# Patient Record
Sex: FEMALE | Race: WHITE | NOT HISPANIC OR LATINO | Employment: FULL TIME | URBAN - METROPOLITAN AREA
[De-identification: names, ages, dates, MRNs, and addresses within clinical notes are randomized per-mention and may not be internally consistent; named-entity substitution may affect disease eponyms.]

---

## 2022-01-15 ENCOUNTER — HOSPITAL ENCOUNTER (EMERGENCY)
Facility: HOSPITAL | Age: 32
Discharge: HOME | End: 2022-01-15
Attending: EMERGENCY MEDICINE
Payer: COMMERCIAL

## 2022-01-15 ENCOUNTER — APPOINTMENT (EMERGENCY)
Dept: RADIOLOGY | Facility: HOSPITAL | Age: 32
End: 2022-01-15
Payer: COMMERCIAL

## 2022-01-15 VITALS
BODY MASS INDEX: 31.28 KG/M2 | DIASTOLIC BLOOD PRESSURE: 96 MMHG | TEMPERATURE: 98 F | HEIGHT: 62 IN | OXYGEN SATURATION: 100 % | SYSTOLIC BLOOD PRESSURE: 144 MMHG | WEIGHT: 170 LBS | HEART RATE: 119 BPM | RESPIRATION RATE: 18 BRPM

## 2022-01-15 DIAGNOSIS — S82.852A TRIMALLEOLAR FRACTURE OF ANKLE, CLOSED, LEFT, INITIAL ENCOUNTER: Primary | ICD-10-CM

## 2022-01-15 DIAGNOSIS — R52 PAIN: ICD-10-CM

## 2022-01-15 LAB
ANION GAP SERPL CALCULATED.3IONS-SCNC: 14 MMOL/L (ref 4–13)
BASOPHILS # BLD AUTO: 0.03 THOUSANDS/ΜL (ref 0–0.1)
BASOPHILS NFR BLD AUTO: 0 % (ref 0–1)
BUN SERPL-MCNC: 10 MG/DL (ref 5–25)
CALCIUM SERPL-MCNC: 8.6 MG/DL (ref 8.3–10.1)
CHLORIDE SERPL-SCNC: 106 MMOL/L (ref 100–108)
CO2 SERPL-SCNC: 23 MMOL/L (ref 21–32)
CREAT SERPL-MCNC: 0.66 MG/DL (ref 0.6–1.3)
EOSINOPHIL # BLD AUTO: 0.02 THOUSAND/ΜL (ref 0–0.61)
EOSINOPHIL NFR BLD AUTO: 0 % (ref 0–6)
ERYTHROCYTE [DISTWIDTH] IN BLOOD BY AUTOMATED COUNT: 12.4 % (ref 11.6–15.1)
GFR SERPL CREATININE-BSD FRML MDRD: 119 ML/MIN/1.73SQ M
GLUCOSE SERPL-MCNC: 109 MG/DL (ref 65–140)
HCG SERPL QL: NEGATIVE
HCT VFR BLD AUTO: 40.7 % (ref 34.8–46.1)
HGB BLD-MCNC: 14.2 G/DL (ref 11.5–15.4)
IMM GRANULOCYTES # BLD AUTO: 0.02 THOUSAND/UL (ref 0–0.2)
IMM GRANULOCYTES NFR BLD AUTO: 0 % (ref 0–2)
LYMPHOCYTES # BLD AUTO: 1.51 THOUSANDS/ΜL (ref 0.6–4.47)
LYMPHOCYTES NFR BLD AUTO: 21 % (ref 14–44)
MCH RBC QN AUTO: 29.6 PG (ref 26.8–34.3)
MCHC RBC AUTO-ENTMCNC: 34.9 G/DL (ref 31.4–37.4)
MCV RBC AUTO: 85 FL (ref 82–98)
MONOCYTES # BLD AUTO: 0.39 THOUSAND/ΜL (ref 0.17–1.22)
MONOCYTES NFR BLD AUTO: 5 % (ref 4–12)
NEUTROPHILS # BLD AUTO: 5.26 THOUSANDS/ΜL (ref 1.85–7.62)
NEUTS SEG NFR BLD AUTO: 74 % (ref 43–75)
NRBC BLD AUTO-RTO: 0 /100 WBCS
PLATELET # BLD AUTO: 283 THOUSANDS/UL (ref 149–390)
PMV BLD AUTO: 10.2 FL (ref 8.9–12.7)
POTASSIUM SERPL-SCNC: 3.9 MMOL/L (ref 3.5–5.3)
RBC # BLD AUTO: 4.8 MILLION/UL (ref 3.81–5.12)
SODIUM SERPL-SCNC: 143 MMOL/L (ref 136–145)
WBC # BLD AUTO: 7.23 THOUSAND/UL (ref 4.31–10.16)

## 2022-01-15 PROCEDURE — 96360 HYDRATION IV INFUSION INIT: CPT

## 2022-01-15 PROCEDURE — 36415 COLL VENOUS BLD VENIPUNCTURE: CPT | Performed by: EMERGENCY MEDICINE

## 2022-01-15 PROCEDURE — 73600 X-RAY EXAM OF ANKLE: CPT

## 2022-01-15 PROCEDURE — 73590 X-RAY EXAM OF LOWER LEG: CPT

## 2022-01-15 PROCEDURE — 99284 EMERGENCY DEPT VISIT MOD MDM: CPT

## 2022-01-15 PROCEDURE — 99204 OFFICE O/P NEW MOD 45 MIN: CPT | Performed by: ORTHOPAEDIC SURGERY

## 2022-01-15 PROCEDURE — 27818 TREATMENT OF ANKLE FRACTURE: CPT | Performed by: EMERGENCY MEDICINE

## 2022-01-15 PROCEDURE — 85025 COMPLETE CBC W/AUTO DIFF WBC: CPT | Performed by: EMERGENCY MEDICINE

## 2022-01-15 PROCEDURE — 73610 X-RAY EXAM OF ANKLE: CPT

## 2022-01-15 PROCEDURE — 27818 TREATMENT OF ANKLE FRACTURE: CPT | Performed by: ORTHOPAEDIC SURGERY

## 2022-01-15 PROCEDURE — 84703 CHORIONIC GONADOTROPIN ASSAY: CPT | Performed by: EMERGENCY MEDICINE

## 2022-01-15 PROCEDURE — 80048 BASIC METABOLIC PNL TOTAL CA: CPT | Performed by: EMERGENCY MEDICINE

## 2022-01-15 PROCEDURE — 99284 EMERGENCY DEPT VISIT MOD MDM: CPT | Performed by: EMERGENCY MEDICINE

## 2022-01-15 RX ORDER — ONDANSETRON 4 MG/1
4 TABLET, ORALLY DISINTEGRATING ORAL EVERY 8 HOURS PRN
Qty: 20 TABLET | Refills: 0 | Status: SHIPPED | OUTPATIENT
Start: 2022-01-15

## 2022-01-15 RX ORDER — KETAMINE HCL IN NACL, ISO-OSM 100MG/10ML
20 SYRINGE (ML) INJECTION ONCE
Status: DISCONTINUED | OUTPATIENT
Start: 2022-01-15 | End: 2022-01-15 | Stop reason: HOSPADM

## 2022-01-15 RX ORDER — HYDROXYZINE HYDROCHLORIDE 10 MG/1
10 TABLET, FILM COATED ORAL DAILY
COMMUNITY

## 2022-01-15 RX ORDER — LIDOCAINE HYDROCHLORIDE AND EPINEPHRINE 20; 5 MG/ML; UG/ML
1 INJECTION, SOLUTION EPIDURAL; INFILTRATION; INTRACAUDAL; PERINEURAL ONCE
Status: COMPLETED | OUTPATIENT
Start: 2022-01-15 | End: 2022-01-15

## 2022-01-15 RX ORDER — PROPOFOL 10 MG/ML
150 INJECTION, EMULSION INTRAVENOUS ONCE
Status: DISCONTINUED | OUTPATIENT
Start: 2022-01-15 | End: 2022-01-15 | Stop reason: HOSPADM

## 2022-01-15 RX ORDER — MELOXICAM 15 MG/1
15 TABLET ORAL DAILY
Qty: 15 TABLET | Refills: 0 | Status: SHIPPED | OUTPATIENT
Start: 2022-01-15

## 2022-01-15 RX ORDER — HYDROCODONE BITARTRATE AND ACETAMINOPHEN 5; 325 MG/1; MG/1
1 TABLET ORAL EVERY 6 HOURS PRN
Qty: 15 TABLET | Refills: 0 | Status: SHIPPED | OUTPATIENT
Start: 2022-01-15

## 2022-01-15 RX ORDER — ESCITALOPRAM OXALATE 10 MG/1
10 TABLET ORAL DAILY
COMMUNITY

## 2022-01-15 RX ADMIN — LIDOCAINE HYDROCHLORIDE AND EPINEPHRINE 1 ML: 20; 5 INJECTION, SOLUTION EPIDURAL; INFILTRATION; INTRACAUDAL; PERINEURAL at 04:28

## 2022-01-15 RX ADMIN — SODIUM CHLORIDE 1000 ML: 0.9 INJECTION, SOLUTION INTRAVENOUS at 06:53

## 2022-01-15 NOTE — ED NOTES
X-ray at bedside      [facility]  01/15/22 7166

## 2022-01-15 NOTE — CONSULTS
Orthopedics   Garcia Gustafson 36 y o  female MRN: [id number]  Unit/Bed#: [facility] XRAY      Chief Complaint:   left ankle pain    HPI:  36 y  o female status post mechanical fall complaining of left ankle pain and inability to bear weight  Patient has no significant past medical history  Patient states that she had a glass of wine as well as a few beers and slipped and twisted her ankle  She states she had immediate pain and inability to ambulate  She did note a deformity of her ankle and presented to the emergency room for evaluation  She had x-rays which demonstrated a displaced trimalleolar fracture  Reduction was attempted by the ED physician however was unsuccessful  We were consulted to attempt better reduction of her fracture dislocation  She denies any chest pain, shortness of breath, nausea, vomiting, diarrhea, lightheadedness, dizziness  She did have numbness and tingling prior to reduction however post reduction she noted improvement  Review Of Systems:   · Skin: Normal  · Neuro: See HPI  · Musculoskeletal: See HPI  · 14 point review of systems negative except as stated above     Past Medical History:   History reviewed  No pertinent past medical history  Past Surgical History:   History reviewed  No pertinent surgical history  Family History:  Family history reviewed and non-contributory  History reviewed  No pertinent family history      Social History:  Social History     Socioeconomic History    Marital status: Single     Spouse name: None    Number of children: None    Years of education: None    Highest education level: None   Occupational History    None   Tobacco Use    Smoking status: Never Smoker    Smokeless tobacco: Never Used   Substance and Sexual Activity    Alcohol use: None    Drug use: None    Sexual activity: None   Other Topics Concern    None   Social History Narrative    None     Social Determinants of Health     Financial Resource Strain: Not on file   Food Insecurity: Not on file   Transportation Needs: Not on file   Physical Activity: Not on file   Stress: Not on file   Social Connections: Not on file   Intimate Partner Violence: Not on file   Housing Stability: Not on file       Allergies: Allergies   Allergen Reactions    Latex Hives    Treenut [Nuts - Food Allergy] Hives           Labs:  0   Lab Value Date/Time    HCT 40 7 01/15/2022 0651    HGB 14 2 01/15/2022 0651    WBC 7 23 01/15/2022 0651       Meds:    Current Facility-Administered Medications:     Ketamine HCl 20 mg, 20 mg, Intravenous, Once, Pamula Zulema, DO    propofol (DIPRIVAN) 200 MG/20ML bolus injection 150 mg, 150 mg, Intravenous, Once, Pamula Zulema, DO    Current Outpatient Medications:     Amivantamab-vmjw 350 MG/7ML SOLN, Infuse into a venous catheter, Disp: , Rfl:     escitalopram (LEXAPRO) 10 mg tablet, Take 10 mg by mouth daily, Disp: , Rfl:     hydrOXYzine HCL (ATARAX) 10 mg tablet, Take 10 mg by mouth in the morning, Disp: , Rfl:     Blood Culture:   No results found for: BLOODCX    Wound Culture:   No results found for: WOUNDCULT    Ins and Outs:  No intake/output data recorded  Physical Exam:   /96 (BP Location: Left arm)   Pulse (!) 119   Temp 98 °F (36 7 °C) (Oral)   Resp 18   Ht 5' 2" (1 575 m)   Wt 77 1 kg (170 lb)   SpO2 100%   BMI 31 09 kg/m²   Gen: Alert and oriented to person, place, time  HEENT: EOMI, eyes clear, moist mucus membranes, hearing intact  Respiratory: Bilateral chest rise   No audible wheezing found  Cardiovascular: Regular Rate and Rhythm  Abdomen: soft nontender/nondistended  Musculoskeletal: left lower extremity  · Skin intact, significant swelling is noted over the medial lateral aspect of the ankle  · Tender to palpation over medial and lateral malleoli  · Range of motion was limited due to fracture dislocation  · Sensation intact DP/SP/Tib/Angélica/Saph  · Positive knee flexion/extension, EHL/FHL  · Palpable DP and PT pulses  · Calf is supple and nontender    Radiology:   I personally reviewed the films  X-rays left ankle shows trimalleolar fracture dislocation, post reduction films were obtained which demonstrated better alignment with the talus over the tibia, can still appreciate medial malleolar fracture and fibular shaft fracture, posterior malleolus fracture appreciated  Procedure- Orthopedics   Vannafloridalma Mccullough 36 y o  female MRN: [id number]  Unit/Bed#: MO XRAY    Procedure: Ankle reduction and splint application    Patient already had adequate anesthesia from previous reduction attempt  A gentle closed reduction maneuver was performed and pt was placed in a well padded AO splint  Pt tolerated the procedure well and was neurovascularly intact both pre and post procedure  Post reduction orthogonal x rays showed appropriate reduction of the talus in the ankle mortise     _*_*_*_*_*_*_*_*_*_*_*_*_*_*_*_*_*_*_*_*_*_*_*_*_*_*_*_*_*_*_*_*_*_*_*_*_*_*_*_*_*    Assessment:  36 y  o female status post mechanical fall with left ankle medial malleolar and fibular shaft fracture dislocation, post reduction in appropriate alignment    Plan:   · NWB left lower extremity in AO splint  · Body mass index is 31 09 kg/m²  mildly obese  Recommend behavior modifications, nutrition and physical activity  · Analgesics as per primary team  · DVT prophylaxis as per primary team  · Dispo: [id number] Radha Mcclain for discharge from ortho perspective  · It was discussed with the patient at length that the attempted reduction was not successful then that repeat reduction would be performed  She tolerated the procedure well  Post reduction x-rays were obtained which demonstrated better alignment of her medial malleolar fracture and fibular shaft fracture  She was advised to keep the extremity elevated  She was advised to apply ice  It was discussed that she will require surgical fixation of her ankle    She is from [place] and she would like to follow-up with an orthopedic back home  She was advised to follow up ASAP for evaluation and treatment  She can take Tylenol and anti-inflammatories as needed for pain  He can be given pain medication from the ED to go home with  If she would like the surgical procedure performed here, she can follow up with Dr Mireya Quiles this week for re-evaluation  No further orthopedic intervention is needed at this time  If there are any questions, please reach out  Jessica Adair PA-C

## 2022-01-15 NOTE — ED PROVIDER NOTES
Pt Name: Leslie Almanza  MRN: [id number]  [place] 1990  Age/Sex: 36 y o  female  Date of evaluation: 1/15/2022  PCP: No primary care provider on file  CHIEF COMPLAINT    Chief Complaint   Patient presents with    Ankle Pain     sLIPPED ON ICE, NOW HAS LEFT ANKLE PAIN         HPI    36 y o  female presenting with left ankle pain deformity  Patient states that she had a glass of wine as well as a few beers tonight, slipped on ice, felt immediate pain in her left ankle  She states that she was able to regain her balance and did not fall  Pain is now dull, severe, in the left ankle, worse with walking or pressing on the area or moving the ankle and better at rest   She denies any other pain or injuries  HPI      Past Medical and Surgical History    History reviewed  No pertinent past medical history  History reviewed  No pertinent surgical history  History reviewed  No pertinent family history  Social History     Tobacco Use    Smoking status: Never Smoker    Smokeless tobacco: Never Used   Substance Use Topics    Alcohol use: Not on file    Drug use: Not on file           Allergies    Allergies   Allergen Reactions    Latex Hives    Treenut [Nuts - Food Allergy] Hives       Home Medications    Prior to Admission medications    Medication Sig Start Date End Date Taking? Authorizing Provider   Amivantamab-vmjw 350 MG/7ML SOLN Infuse into a venous catheter   Yes Historical Provider, MD   escitalopram (LEXAPRO) 10 mg tablet Take 10 mg by mouth daily   Yes Historical Provider, MD   hydrOXYzine HCL (ATARAX) 10 mg tablet Take 10 mg by mouth in the morning   Yes Historical Provider, MD           Review of Systems    Review of Systems   Constitutional: Negative for activity change, chills and fever  HENT: Negative for drooling and facial swelling  Eyes: Negative for pain, discharge and visual disturbance     Respiratory: Negative for apnea, cough, chest tightness, shortness of breath and wheezing  Cardiovascular: Negative for chest pain and leg swelling  Gastrointestinal: Negative for abdominal pain, constipation, diarrhea, nausea and vomiting  Genitourinary: Negative for difficulty urinating, dysuria and urgency  Musculoskeletal: Positive for arthralgias, gait problem and joint swelling  Negative for back pain  Skin: Negative for color change and rash  Neurological: Negative for dizziness, speech difficulty, weakness and headaches  Psychiatric/Behavioral: Negative for agitation, behavioral problems and confusion  All other systems reviewed and negative  Physical Exam      ED Triage Vitals [01/15/22 0333]   Temperature Pulse Respirations Blood Pressure SpO2   98 °F (36 7 °C) (!) 109 18 133/92 99 %      Temp Source Heart Rate Source Patient Position - Orthostatic VS BP Location FiO2 (%)   Oral Monitor Lying Right arm --      Pain Score       6               Physical Exam  Vitals and nursing note reviewed  Constitutional:       Appearance: She is well-developed  HENT:      Head: Normocephalic and atraumatic  Right Ear: External ear normal       Left Ear: External ear normal    Eyes:      Conjunctiva/sclera: Conjunctivae normal       Pupils: Pupils are equal, round, and reactive to light  Cardiovascular:      Rate and Rhythm: Normal rate and regular rhythm  Heart sounds: Normal heart sounds  Pulmonary:      Effort: Pulmonary effort is normal  No respiratory distress  Breath sounds: Normal breath sounds  No wheezing or rales  Abdominal:      General: There is no distension  Palpations: Abdomen is soft  Tenderness: There is no abdominal tenderness  There is no guarding or rebound  Musculoskeletal:         General: Swelling, tenderness and deformity present  Normal range of motion  Cervical back: Normal range of motion and neck supple  Comments: Left ankle deformed, swollen, tender to palpation    Strength sensation pulse and cap refill intact distal although range of motion limited by pain  Compartments soft  No other significant bony tenderness  Skin:     General: Skin is warm and dry  Findings: No erythema or rash  Neurological:      Mental Status: She is alert and oriented to person, place, and time  Psychiatric:         Behavior: Behavior normal          Thought Content:  Thought content normal          Judgment: Judgment normal               Diagnostic Results      Labs:    Results Reviewed     Procedure Component Value Units Date/Time    hCG, qualitative pregnancy [[id number]]  (Normal) Collected: 01/15/22 0651    Lab Status: Final result Specimen: Blood from Arm, Left Updated: 01/15/22 0725     Preg, Serum Negative    Basic metabolic panel [[id number]]  (Abnormal) Collected: 01/15/22 0651    Lab Status: Final result Specimen: Blood from Arm, Left Updated: 01/15/22 0725     Sodium 143 mmol/L      Potassium 3 9 mmol/L      Chloride 106 mmol/L      CO2 23 mmol/L      ANION GAP 14 mmol/L      BUN 10 mg/dL      Creatinine 0 66 mg/dL      Glucose 109 mg/dL      Calcium 8 6 mg/dL      eGFR 119 ml/min/1 73sq m     Narrative:      [place] guidelines for Chronic Kidney Disease (CKD):     Stage 1 with normal or high GFR (GFR > 90 mL/min/1 73 square meters)    Stage 2 Mild CKD (GFR = 60-89 mL/min/1 73 square meters)    Stage 3A Moderate CKD (GFR = 45-59 mL/min/1 73 square meters)    Stage 3B Moderate CKD (GFR = 30-44 mL/min/1 73 square meters)    Stage 4 Severe CKD (GFR = 15-29 mL/min/1 73 square meters)    Stage 5 End Stage CKD (GFR <15 mL/min/1 73 square meters)  Note: GFR calculation is accurate only with a steady state creatinine    CBC and differential [[id number]] Collected: 01/15/22 0651    Lab Status: Final result Specimen: Blood from Arm, Left Updated: 01/15/22 0702     WBC 7 23 Thousand/uL      RBC 4 80 Million/uL      Hemoglobin 14 2 g/dL      Hematocrit 40 7 %      MCV 85 fL      MCH 29 6 pg      MCHC 34 9 g/dL      RDW 12 4 %      MPV 10 2 fL      Platelets 033 Thousands/uL      nRBC 0 /100 WBCs      Neutrophils Relative 74 %      Immat GRANS % 0 %      Lymphocytes Relative 21 %      Monocytes Relative 5 %      Eosinophils Relative 0 %      Basophils Relative 0 %      Neutrophils Absolute 5 26 Thousands/µL      Immature Grans Absolute 0 02 Thousand/uL      Lymphocytes Absolute 1 51 Thousands/µL      Monocytes Absolute 0 39 Thousand/µL      Eosinophils Absolute 0 02 Thousand/µL      Basophils Absolute 0 03 Thousands/µL     POCT pregnancy, urine [[id number]]     Lab Status: No result           All labs reviewed and utilized in the medical decision making process    Radiology:    XR ankle 3+ views LEFT   ED Interpretation   Improved alignment        XR tibia fibula 2 views LEFT    (Results Pending)   XR ankle 2 vw left    (Results Pending)       All radiology studies independently viewed by me and interpreted by the radiologist     Procedure    Orthopedic injury treatment    Date/Time: 1/15/2022 4:40 AM  Performed by: Ron Jj MD  Authorized by: Ron Jj MD     Patient Location:  ED  Verbal consent obtained?: Yes    Risks and benefits: Risks, benefits and alternatives were discussed    Consent given by:  Patient  Patient identity confirmed:  Provided demographic data  Time out: Immediately prior to the procedure a time out was called    Injury location:  Ankle  Location details:  Left ankle  Injury type:  Fracture-dislocation  Fracture type: trimalleolar    Distal perfusion: diminished    Neurological function: normal    Range of motion: reduced    Local anesthesia used?: Yes    Anesthesia:  Hematoma block  Local anesthetic:  Lidocaine 2% with epinephrine  Anesthetic total (ml):  10  Manipulation performed?: Yes    Reduction successful?: Yes    Confirmation: Reduction confirmed by x-ray    Immobilization:  Splint  Splint type:  Long leg  Supplies used:  Cotton padding, elastic bandage and Ortho-Glass  Neurovascular status: Neurovascularly intact    Distal perfusion: normal    Neurological function: normal    Range of motion: unchanged    Patient tolerance:  Patient tolerated the procedure well with no immediate complications            ED Course of Care and Re-Assessments      Symptoms improved substantially with reduction and splinting facilitated by hematoma block  On postreduction films, alignment improved, discussed case with Dr Courtney Carrillo on-call for Orthopedics who recommended re reduction based on positioning of tibia and talus  Plan formed for repeat reduction by orthopedics in emergency department facilitated by procedural sedation if necessary, with external fixation if that is unsuccessful  Medications   sodium chloride 0 9 % bolus 1,000 mL (1,000 mL Intravenous New Bag 1/15/22 0653)   lidocaine-epinephrine (XYLOCAINE-MPF/EPINEPHRINE) 2 %-1:200,000 injection 1 mL (1 mL Infiltration Given 1/15/22 3388)           FINAL IMPRESSION    Final diagnoses:   Trimalleolar fracture of ankle, closed, left, initial encounter         DISPOSITION/PLAN    Presentation as above with trimalleolar fracture of the ankle status post slip on ice  Vital signs remarkable for mild tachycardia, examination remarkable for deformity and tenderness as above  No evidence of compartment syndrome or significant neurovascular disruption at this time although cap refill initially slightly compromised  Situation much improved with reduction splinting, after consultation with Orthopedics patient signed out to Dr Jazz Gallegos at time of shift change pending orthopedic evaluation    Time reflects when diagnosis was documented in both MDM as applicable and the Disposition within this note     Time User Action Codes Description Comment    1/15/2022  7:04 AM Sergio Underwood Add [[id number]] Trimalleolar fracture of ankle, closed, left, initial encounter       ED Disposition     None      Follow-up Information None           PATIENT REFERRED TO:    No follow-up provider specified  DISCHARGE MEDICATIONS:    Patient's Medications   Discharge Prescriptions    No medications on file       No discharge procedures on file  Manuel Hernandez MD    Portions of the record may have been created with voice recognition software  Occasional wrong word or "sound alike" substitutions may have occurred due to the inherent limitations of voice recognition software    Please read the chart carefully and recognize, using context, where substitutions have occurred     Manuel Hernandez MD  01/15/22 0914